# Patient Record
Sex: MALE | Race: WHITE | URBAN - METROPOLITAN AREA
[De-identification: names, ages, dates, MRNs, and addresses within clinical notes are randomized per-mention and may not be internally consistent; named-entity substitution may affect disease eponyms.]

---

## 2022-01-10 ENCOUNTER — OFFICE VISIT (OUTPATIENT)
Dept: URBAN - METROPOLITAN AREA CLINIC 54 | Facility: CLINIC | Age: 67
End: 2022-01-10

## 2022-01-10 RX ORDER — RISPERIDONE 2 MG/1
TABLET ORAL
Qty: 14 | Refills: 2 | Status: ACTIVE | COMMUNITY

## 2022-01-10 RX ORDER — PROPRANOLOL HYDROCHLORIDE 10 MG/1
TABLET ORAL
Qty: 21 | Refills: 1 | Status: ACTIVE | COMMUNITY

## 2022-01-10 RX ORDER — APIXABAN 5 MG/1
TABLET, FILM COATED ORAL
Qty: 14 | Refills: 2 | Status: ACTIVE | COMMUNITY

## 2022-01-10 RX ORDER — OMEPRAZOLE 40 MG/1
CAPSULE, DELAYED RELEASE ORAL
Qty: 7 | Refills: 1 | Status: ACTIVE | COMMUNITY

## 2022-01-10 RX ORDER — ERTAPENEM SODIUM 1 G/20ML
INJECTION INTRAMUSCULAR; INTRAVENOUS
Qty: 7 | Refills: 0 | Status: ACTIVE | COMMUNITY

## 2022-01-10 RX ORDER — AMLODIPINE BESYLATE 10 MG/1
TABLET ORAL
Qty: 7 | Refills: 3 | Status: ACTIVE | COMMUNITY

## 2022-01-10 RX ORDER — METOPROLOL TARTRATE 50 MG/1
TABLET, FILM COATED ORAL
Qty: 14 | Refills: 3 | Status: ACTIVE | COMMUNITY

## 2022-01-10 RX ORDER — SUCRALFATE 1 G/1
TABLET ORAL
Qty: 40 | Refills: 0 | Status: ACTIVE | COMMUNITY

## 2022-01-10 RX ORDER — DONEPEZIL HYDROCHLORIDE 10 MG/1
TABLET, FILM COATED ORAL
Qty: 7 | Refills: 3 | Status: ACTIVE | COMMUNITY

## 2022-01-10 RX ORDER — GABAPENTIN 300 MG/1
CAPSULE ORAL
Qty: 21 | Refills: 3 | Status: ACTIVE | COMMUNITY

## 2022-01-10 RX ORDER — LIDOCAINE HYDROCHLORIDE 10 MG/ML
INJECTION, SOLUTION INFILTRATION; PERINEURAL
Qty: 40 | Refills: 0 | Status: ACTIVE | COMMUNITY

## 2022-01-10 RX ORDER — VENLAFAXINE 75 MG/1
TABLET ORAL
Qty: 7 | Refills: 3 | Status: ACTIVE | COMMUNITY

## 2022-01-10 RX ORDER — AMANTADINE HYDROCHLORIDE 100 MG/1
TABLET ORAL
Qty: 14 | Refills: 3 | Status: ACTIVE | COMMUNITY

## 2022-01-10 RX ORDER — DIVALPROEX SODIUM 125 MG/1
CAPSULE, COATED PELLETS ORAL
Qty: 84 | Refills: 3 | Status: ACTIVE | COMMUNITY

## 2022-01-10 RX ORDER — LEVOTHYROXINE SODIUM 0.15 MG/1
TABLET ORAL
Qty: 7 | Refills: 3 | Status: ACTIVE | COMMUNITY

## 2022-01-10 RX ORDER — LOSARTAN POTASSIUM 25 MG/1
TABLET, FILM COATED ORAL
Qty: 7 | Refills: 3 | Status: ACTIVE | COMMUNITY

## 2022-01-10 RX ORDER — TAMSULOSIN HYDROCHLORIDE 0.4 MG/1
CAPSULE ORAL
Qty: 7 | Refills: 3 | Status: ACTIVE | COMMUNITY

## 2022-01-10 RX ORDER — POTASSIUM CHLORIDE 1500 MG/1
TABLET, EXTENDED RELEASE ORAL
Qty: 84 | Refills: 26 | Status: ACTIVE | COMMUNITY

## 2022-01-10 RX ORDER — QUETIAPINE 300 MG/1
TABLET, FILM COATED ORAL
Qty: 14 | Refills: 1 | Status: ACTIVE | COMMUNITY

## 2022-01-10 RX ORDER — CLONAZEPAM 1 MG/1
TABLET ORAL
Qty: 60 | Refills: 0 | Status: ACTIVE | COMMUNITY

## 2022-03-01 ENCOUNTER — OFFICE VISIT (OUTPATIENT)
Dept: URBAN - METROPOLITAN AREA CLINIC 54 | Facility: CLINIC | Age: 67
End: 2022-03-01
Payer: MEDICARE

## 2022-03-01 ENCOUNTER — DASHBOARD ENCOUNTERS (OUTPATIENT)
Age: 67
End: 2022-03-01

## 2022-03-01 ENCOUNTER — WEB ENCOUNTER (OUTPATIENT)
Dept: URBAN - METROPOLITAN AREA CLINIC 54 | Facility: CLINIC | Age: 67
End: 2022-03-01

## 2022-03-01 DIAGNOSIS — R13.19 CERVICAL DYSPHAGIA: ICD-10-CM

## 2022-03-01 DIAGNOSIS — R10.33 ABDOMINAL PAIN, ACUTE, PERIUMBILICAL: ICD-10-CM

## 2022-03-01 DIAGNOSIS — Z12.11 COLON CANCER SCREENING: ICD-10-CM

## 2022-03-01 PROCEDURE — 99204 OFFICE O/P NEW MOD 45 MIN: CPT

## 2022-03-01 PROCEDURE — 99244 OFF/OP CNSLTJ NEW/EST MOD 40: CPT

## 2022-03-01 RX ORDER — SODIUM, POTASSIUM,MAG SULFATES 17.5-3.13G
354 ML SOLUTION, RECONSTITUTED, ORAL ORAL AS DIRECTED
Qty: 1 BOX | Refills: 0 | OUTPATIENT
Start: 2022-03-01 | End: 2022-03-03

## 2022-03-01 RX ORDER — VENLAFAXINE 75 MG/1
TABLET ORAL
Qty: 7 | Refills: 3 | COMMUNITY

## 2022-03-01 RX ORDER — LEVOTHYROXINE SODIUM 0.15 MG/1
TABLET ORAL
Qty: 7 | Refills: 3 | COMMUNITY

## 2022-03-01 RX ORDER — AMLODIPINE BESYLATE 10 MG/1
TABLET ORAL
Qty: 7 | Refills: 3 | COMMUNITY

## 2022-03-01 RX ORDER — TAMSULOSIN HYDROCHLORIDE 0.4 MG/1
CAPSULE ORAL
Qty: 7 | Refills: 3 | COMMUNITY

## 2022-03-01 RX ORDER — LOSARTAN POTASSIUM 25 MG/1
TABLET, FILM COATED ORAL
Qty: 7 | Refills: 3 | COMMUNITY

## 2022-03-01 RX ORDER — ERTAPENEM SODIUM 1 G/20ML
INJECTION INTRAMUSCULAR; INTRAVENOUS
Qty: 7 | Refills: 0 | COMMUNITY

## 2022-03-01 RX ORDER — CLONAZEPAM 1 MG/1
TABLET ORAL
Qty: 60 | Refills: 0 | COMMUNITY

## 2022-03-01 RX ORDER — SUCRALFATE 1 G/1
TABLET ORAL
Qty: 40 | Refills: 0 | COMMUNITY

## 2022-03-01 RX ORDER — POTASSIUM CHLORIDE 1500 MG/1
TABLET, EXTENDED RELEASE ORAL
Qty: 84 | Refills: 26 | COMMUNITY

## 2022-03-01 RX ORDER — OMEPRAZOLE 40 MG/1
CAPSULE, DELAYED RELEASE ORAL
Qty: 7 | Refills: 1 | COMMUNITY

## 2022-03-01 RX ORDER — RISPERIDONE 2 MG/1
TABLET ORAL
Qty: 14 | Refills: 2 | COMMUNITY

## 2022-03-01 RX ORDER — AMANTADINE HYDROCHLORIDE 100 MG/1
TABLET ORAL
Qty: 14 | Refills: 3 | COMMUNITY

## 2022-03-01 RX ORDER — PROPRANOLOL HYDROCHLORIDE 10 MG/1
TABLET ORAL
Qty: 21 | Refills: 1 | COMMUNITY

## 2022-03-01 RX ORDER — METOPROLOL TARTRATE 50 MG/1
TABLET, FILM COATED ORAL
Qty: 14 | Refills: 3 | COMMUNITY

## 2022-03-01 RX ORDER — QUETIAPINE 300 MG/1
TABLET, FILM COATED ORAL
Qty: 14 | Refills: 1 | COMMUNITY

## 2022-03-01 RX ORDER — DIVALPROEX SODIUM 125 MG/1
CAPSULE, COATED PELLETS ORAL
Qty: 84 | Refills: 3 | COMMUNITY

## 2022-03-01 RX ORDER — LIDOCAINE HYDROCHLORIDE 10 MG/ML
INJECTION, SOLUTION INFILTRATION; PERINEURAL
Qty: 40 | Refills: 0 | COMMUNITY

## 2022-03-01 RX ORDER — DONEPEZIL HYDROCHLORIDE 10 MG/1
TABLET, FILM COATED ORAL
Qty: 7 | Refills: 3 | COMMUNITY

## 2022-03-01 RX ORDER — GABAPENTIN 300 MG/1
CAPSULE ORAL
Qty: 21 | Refills: 3 | COMMUNITY

## 2022-03-01 RX ORDER — APIXABAN 5 MG/1
TABLET, FILM COATED ORAL
Qty: 14 | Refills: 2 | COMMUNITY

## 2022-03-01 NOTE — PHYSICAL EXAM GASTROINTESTINAL
Abdomen , soft, diffuse lower abdominal tenderness, nondistended , no guarding or rigidity , no masses palpable , normal bowel sounds , Liver and Spleen , no hepatomegaly present , no hepatosplenomegaly , liver nontender , spleen not palpable

## 2022-03-01 NOTE — HPI-TODAY'S VISIT:
Pt is a 67 yo male with PMH of schizoaffective disorder, bipolar disorder, Parkinsons, HTN who presents from [facility] in [place] on a stretcher for EGD and colonoscopy consult. Pt is a very poor historian. Reports progressive difficulty swallowing, heartburn, reflux, regurgitation. Pt has issues with food and liquids. Pt states this has been an issue his whole life. He has never had an EGD, on daily omeprazole 40mg. Also complains of lower abdominal pain. Has normal BMs once per day, no diarrhea, constipation, blood in stool, melena. Pt has had multiple abd surgeries including appendectomy and cholecystectomy. Last colonscopy was 12 years ago in [place]. Pt does not remember results. Pt is adopted so he does not know family hx. Pt is on Eliquis.

## 2022-03-01 NOTE — PHYSICAL EXAM HENT:
Head,  normocephalic,  atraumatic,  Face,  Face within normal limits,  Ears,  External ears within normal limits,  Nose/Nasopharynx,  External nose  normal appearance,  nares patent,  no nasal discharge,  Mouth and Throat, Missing teeth, no dentures,  Lips,  Appearance normal

## 2022-03-01 NOTE — PHYSICAL EXAM CONSTITUTIONAL:
well developed, well nourished , in no acute distress , on a stretcher, disorganized communication
weight-bearing as tolerated

## 2022-05-12 ENCOUNTER — OFFICE VISIT (OUTPATIENT)
Dept: URBAN - METROPOLITAN AREA MEDICAL CENTER 23 | Facility: MEDICAL CENTER | Age: 67
End: 2022-05-12

## 2022-05-25 PROBLEM — 305058001: Status: ACTIVE | Noted: 2022-04-27

## 2022-05-25 PROBLEM — 40739000: Status: ACTIVE | Noted: 2022-03-01

## 2022-06-16 ENCOUNTER — OFFICE VISIT (OUTPATIENT)
Dept: URBAN - METROPOLITAN AREA MEDICAL CENTER 23 | Facility: MEDICAL CENTER | Age: 67
End: 2022-06-16
Payer: MEDICARE

## 2022-06-16 DIAGNOSIS — Z12.11 COLON CANCER SCREENING: ICD-10-CM

## 2022-06-16 DIAGNOSIS — K31.89 ACQUIRED DEFORMITY OF DUODENUM: ICD-10-CM

## 2022-06-16 DIAGNOSIS — R13.19 CERVICAL DYSPHAGIA: ICD-10-CM

## 2022-06-16 PROCEDURE — 43239 EGD BIOPSY SINGLE/MULTIPLE: CPT | Performed by: INTERNAL MEDICINE

## 2022-06-16 PROCEDURE — G0121 COLON CA SCRN NOT HI RSK IND: HCPCS | Performed by: INTERNAL MEDICINE

## 2022-06-16 PROCEDURE — 43248 EGD GUIDE WIRE INSERTION: CPT | Performed by: INTERNAL MEDICINE

## 2022-06-16 RX ORDER — GABAPENTIN 300 MG/1
CAPSULE ORAL
Qty: 21 | Refills: 3 | COMMUNITY

## 2022-06-16 RX ORDER — CLONAZEPAM 1 MG/1
TABLET ORAL
Qty: 60 | Refills: 0 | COMMUNITY

## 2022-06-16 RX ORDER — RISPERIDONE 2 MG/1
TABLET ORAL
Qty: 14 | Refills: 2 | COMMUNITY

## 2022-06-16 RX ORDER — VENLAFAXINE 75 MG/1
TABLET ORAL
Qty: 7 | Refills: 3 | COMMUNITY

## 2022-06-16 RX ORDER — AMANTADINE HYDROCHLORIDE 100 MG/1
TABLET ORAL
Qty: 14 | Refills: 3 | COMMUNITY

## 2022-06-16 RX ORDER — LIDOCAINE HYDROCHLORIDE 10 MG/ML
INJECTION, SOLUTION INFILTRATION; PERINEURAL
Qty: 40 | Refills: 0 | COMMUNITY

## 2022-06-16 RX ORDER — PROPRANOLOL HYDROCHLORIDE 10 MG/1
TABLET ORAL
Qty: 21 | Refills: 1 | COMMUNITY

## 2022-06-16 RX ORDER — OMEPRAZOLE 40 MG/1
CAPSULE, DELAYED RELEASE ORAL
Qty: 7 | Refills: 1 | COMMUNITY

## 2022-06-16 RX ORDER — LOSARTAN POTASSIUM 25 MG/1
TABLET, FILM COATED ORAL
Qty: 7 | Refills: 3 | COMMUNITY

## 2022-06-16 RX ORDER — METOPROLOL TARTRATE 50 MG/1
TABLET, FILM COATED ORAL
Qty: 14 | Refills: 3 | COMMUNITY

## 2022-06-16 RX ORDER — TAMSULOSIN HYDROCHLORIDE 0.4 MG/1
CAPSULE ORAL
Qty: 7 | Refills: 3 | COMMUNITY

## 2022-06-16 RX ORDER — DONEPEZIL HYDROCHLORIDE 10 MG/1
TABLET, FILM COATED ORAL
Qty: 7 | Refills: 3 | COMMUNITY

## 2022-06-16 RX ORDER — DIVALPROEX SODIUM 125 MG/1
CAPSULE, COATED PELLETS ORAL
Qty: 84 | Refills: 3 | COMMUNITY

## 2022-06-16 RX ORDER — APIXABAN 5 MG/1
TABLET, FILM COATED ORAL
Qty: 14 | Refills: 2 | COMMUNITY

## 2022-06-16 RX ORDER — QUETIAPINE 300 MG/1
TABLET, FILM COATED ORAL
Qty: 14 | Refills: 1 | COMMUNITY

## 2022-06-16 RX ORDER — ERTAPENEM SODIUM 1 G/20ML
INJECTION INTRAMUSCULAR; INTRAVENOUS
Qty: 7 | Refills: 0 | COMMUNITY

## 2022-06-16 RX ORDER — POTASSIUM CHLORIDE 1500 MG/1
TABLET, EXTENDED RELEASE ORAL
Qty: 84 | Refills: 26 | COMMUNITY

## 2022-06-16 RX ORDER — SUCRALFATE 1 G/1
TABLET ORAL
Qty: 40 | Refills: 0 | COMMUNITY

## 2022-06-16 RX ORDER — AMLODIPINE BESYLATE 10 MG/1
TABLET ORAL
Qty: 7 | Refills: 3 | COMMUNITY

## 2022-06-16 RX ORDER — LEVOTHYROXINE SODIUM 0.15 MG/1
TABLET ORAL
Qty: 7 | Refills: 3 | COMMUNITY